# Patient Record
Sex: MALE | Race: BLACK OR AFRICAN AMERICAN | Employment: FULL TIME
[De-identification: names, ages, dates, MRNs, and addresses within clinical notes are randomized per-mention and may not be internally consistent; named-entity substitution may affect disease eponyms.]

---

## 2024-08-20 ENCOUNTER — HOSPITAL ENCOUNTER (EMERGENCY)
Facility: HOSPITAL | Age: 24
Discharge: HOME | End: 2024-08-20
Payer: COMMERCIAL

## 2024-08-20 VITALS
TEMPERATURE: 98.4 F | RESPIRATION RATE: 14 BRPM | OXYGEN SATURATION: 100 % | WEIGHT: 156.97 LBS | HEART RATE: 60 BPM | SYSTOLIC BLOOD PRESSURE: 165 MMHG | DIASTOLIC BLOOD PRESSURE: 89 MMHG

## 2024-08-20 DIAGNOSIS — B02.9 HERPES ZOSTER WITHOUT COMPLICATION: Primary | ICD-10-CM

## 2024-08-20 PROCEDURE — 99283 EMERGENCY DEPT VISIT LOW MDM: CPT

## 2024-08-20 RX ORDER — IBUPROFEN 600 MG/1
600 TABLET ORAL EVERY 6 HOURS PRN
Qty: 30 TABLET | Refills: 0 | Status: SHIPPED | OUTPATIENT
Start: 2024-08-20

## 2024-08-20 RX ORDER — ACETAMINOPHEN 500 MG
500 TABLET ORAL
Qty: 30 TABLET | Refills: 5 | Status: SHIPPED | OUTPATIENT
Start: 2024-08-20

## 2024-08-20 RX ORDER — VALACYCLOVIR HYDROCHLORIDE 1 G/1
1000 TABLET, FILM COATED ORAL 3 TIMES DAILY
Qty: 21 TABLET | Refills: 0 | Status: SHIPPED | OUTPATIENT
Start: 2024-08-20 | End: 2024-08-27

## 2024-08-20 ASSESSMENT — PAIN - FUNCTIONAL ASSESSMENT: PAIN_FUNCTIONAL_ASSESSMENT: 0-10

## 2024-08-20 ASSESSMENT — PAIN DESCRIPTION - PAIN TYPE: TYPE: ACUTE PAIN

## 2024-08-20 ASSESSMENT — PAIN SCALES - GENERAL: PAINLEVEL_OUTOF10: 7

## 2024-08-20 ASSESSMENT — PAIN DESCRIPTION - ORIENTATION: ORIENTATION: RIGHT

## 2024-08-20 ASSESSMENT — PAIN DESCRIPTION - DESCRIPTORS: DESCRIPTORS: ACHING

## 2024-08-20 ASSESSMENT — PAIN DESCRIPTION - LOCATION: LOCATION: ARM

## 2024-08-20 NOTE — DISCHARGE INSTRUCTIONS
Your rash appears to be shingles  Fluid from the rash can be contagious, keep it covered, wash your hands often  Take medication as prescribed  Can take Tylenol and ibuprofen as needed for discomfort  Follow-up with primary care, resources were given to you in discharge  Return to ER if any new or worsening symptoms or new concerns

## 2024-08-20 NOTE — ED PROVIDER NOTES
work note    Medical Chart Review:  I have reviewed triage nursing documentation.      Disposition:  Home  in good condition.      Chief Complaint   Patient presents with    Arm Pain     HPI:    The history is provided by patient. No  used.    Iam Smith is a 24 y.o. male presenting to the Emergency Department with complaints of painful rash right axilla. Patient comes the ED with painful rash in his right axilla.  Patient states it started about 3 to 4 days ago.  He states the skin is very sensitive and painful with light touch.  He has had some new lesions appear over the last 24 hours.  He denies any leakage of fluid or crusting.  There is been no fever, chills or flulike illness.  No other areas of rash.  No trauma or new topicals.  No others at home with similar rash.  Patient relocated to this area from [facility] 4 months ago.  No recent travel.  No chronic medical problems, takes no chronic medications.  Immunizations are up-to-date.  Unsure if he had chickenpox as a child or has been immunized.    I have reviewed all PMHX, FMHX and Social Hx as entered into the medical record in the chart below using the Epic Template.    Review of Systems:  Constitutional: neg for fever, chills  ENT:  neg for URI symptoms  Respiratory:  neg for cough, shortness of breath  Cardiovascular:  neg for chest pain  GI:  neg for abdominal pain.  :  No urinary symptoms. No Flank pain.  MSK: neg for back pain. + right axilla, no additional extremity pain.   Integumentary: positive for rash   Neurological: neg for headaches  All other systems reviewed negative with exception of positives in ROS and HPI.    Past Medical History:  No past medical history on file.    Past Surgical History:  No past surgical history on file.    Family History:  No family history on file.    Social History:  Social History     Tobacco Use    Smoking status: Never   Substance Use Topics    Alcohol use: Not Currently    Drug use:

## 2024-08-20 NOTE — ED TRIAGE NOTES
Patient does not speak english speaks swahili    Patient c/o 4 days ago a bump in arm pit. Needs a work note also